# Patient Record
Sex: FEMALE | Race: WHITE
[De-identification: names, ages, dates, MRNs, and addresses within clinical notes are randomized per-mention and may not be internally consistent; named-entity substitution may affect disease eponyms.]

---

## 2018-05-18 ENCOUNTER — HOSPITAL ENCOUNTER (EMERGENCY)
Dept: HOSPITAL 17 - NEPC | Age: 43
Discharge: HOME | End: 2018-05-18
Payer: SELF-PAY

## 2018-05-18 VITALS — RESPIRATION RATE: 17 BRPM | OXYGEN SATURATION: 97 %

## 2018-05-18 VITALS — WEIGHT: 110.23 LBS | HEIGHT: 60 IN | BODY MASS INDEX: 21.64 KG/M2

## 2018-05-18 VITALS
DIASTOLIC BLOOD PRESSURE: 89 MMHG | TEMPERATURE: 97.5 F | HEART RATE: 75 BPM | SYSTOLIC BLOOD PRESSURE: 132 MMHG | RESPIRATION RATE: 20 BRPM | OXYGEN SATURATION: 97 %

## 2018-05-18 VITALS
RESPIRATION RATE: 15 BRPM | OXYGEN SATURATION: 100 % | DIASTOLIC BLOOD PRESSURE: 75 MMHG | SYSTOLIC BLOOD PRESSURE: 130 MMHG | HEART RATE: 85 BPM | TEMPERATURE: 98 F

## 2018-05-18 DIAGNOSIS — R06.02: ICD-10-CM

## 2018-05-18 DIAGNOSIS — R50.9: ICD-10-CM

## 2018-05-18 DIAGNOSIS — N64.4: Primary | ICD-10-CM

## 2018-05-18 LAB
BASOPHILS # BLD AUTO: 0.1 TH/MM3 (ref 0–0.2)
BASOPHILS NFR BLD: 0.6 % (ref 0–2)
BUN SERPL-MCNC: 14 MG/DL (ref 7–18)
CALCIUM SERPL-MCNC: 8.5 MG/DL (ref 8.5–10.1)
CHLORIDE SERPL-SCNC: 107 MEQ/L (ref 98–107)
CREAT SERPL-MCNC: 0.77 MG/DL (ref 0.5–1)
D-DIMER: (no result) MG/L FEU (ref 0–0.5)
EOSINOPHIL # BLD: 0.3 TH/MM3 (ref 0–0.4)
EOSINOPHIL NFR BLD: 2.9 % (ref 0–4)
ERYTHROCYTE [DISTWIDTH] IN BLOOD BY AUTOMATED COUNT: 13.7 % (ref 11.6–17.2)
GFR SERPLBLD BASED ON 1.73 SQ M-ARVRAT: 82 ML/MIN (ref 89–?)
GLUCOSE SERPL-MCNC: 100 MG/DL (ref 74–106)
HCO3 BLD-SCNC: 26.3 MEQ/L (ref 21–32)
HCT VFR BLD CALC: 35.5 % (ref 35–46)
HGB BLD-MCNC: 12.1 GM/DL (ref 11.6–15.3)
INR PPP: 1 RATIO
LYMPHOCYTES # BLD AUTO: 2.7 TH/MM3 (ref 1–4.8)
LYMPHOCYTES NFR BLD AUTO: 28.1 % (ref 9–44)
MAGNESIUM SERPL-MCNC: 2.4 MG/DL (ref 1.5–2.5)
MCH RBC QN AUTO: 28.8 PG (ref 27–34)
MCHC RBC AUTO-ENTMCNC: 34 % (ref 32–36)
MCV RBC AUTO: 84.9 FL (ref 80–100)
MONOCYTE #: 0.6 TH/MM3 (ref 0–0.9)
MONOCYTES NFR BLD: 6.2 % (ref 0–8)
NEUTROPHILS # BLD AUTO: 6.1 TH/MM3 (ref 1.8–7.7)
NEUTROPHILS NFR BLD AUTO: 62.2 % (ref 16–70)
PLATELET # BLD: 200 TH/MM3 (ref 150–450)
PMV BLD AUTO: 8.3 FL (ref 7–11)
PROTHROMBIN TIME: 10.3 SEC (ref 9.8–11.6)
RBC # BLD AUTO: 4.18 MIL/MM3 (ref 4–5.3)
SODIUM SERPL-SCNC: 142 MEQ/L (ref 136–145)
TROPONIN I SERPL-MCNC: (no result) NG/ML (ref 0.02–0.05)
WBC # BLD AUTO: 9.8 TH/MM3 (ref 4–11)

## 2018-05-18 PROCEDURE — 85379 FIBRIN DEGRADATION QUANT: CPT

## 2018-05-18 PROCEDURE — 83735 ASSAY OF MAGNESIUM: CPT

## 2018-05-18 PROCEDURE — 84484 ASSAY OF TROPONIN QUANT: CPT

## 2018-05-18 PROCEDURE — 71046 X-RAY EXAM CHEST 2 VIEWS: CPT

## 2018-05-18 PROCEDURE — 85730 THROMBOPLASTIN TIME PARTIAL: CPT

## 2018-05-18 PROCEDURE — 93005 ELECTROCARDIOGRAM TRACING: CPT

## 2018-05-18 PROCEDURE — 85025 COMPLETE CBC W/AUTO DIFF WBC: CPT

## 2018-05-18 PROCEDURE — 85610 PROTHROMBIN TIME: CPT

## 2018-05-18 PROCEDURE — 82550 ASSAY OF CK (CPK): CPT

## 2018-05-18 PROCEDURE — 80048 BASIC METABOLIC PNL TOTAL CA: CPT

## 2018-05-18 NOTE — RADRPT
EXAM DATE/TIME:  05/18/2018 17:18 

 

[facility] COMPARISON:     

No previous studies available for comparison.

 

                     

INDICATIONS :     

Chest pain and shortness of breath.

                     

 

MEDICAL HISTORY :            

Unobtainable.   

 

SURGICAL HISTORY :        

Unobtainable.

 

ENCOUNTER:     

Initial                                        

 

ACUITY:     

1 day      

 

PAIN SCORE:     

7/10

 

LOCATION:      

chest 

 

FINDINGS:     

PA and lateral views of the chest demonstrate the lungs to be symmetrically aerated without evidence 
of mass, infiltrate or effusion.  The cardiomediastinal contours are unremarkable.  Osseous structure
s are intact.

 

CONCLUSION:     Normal examination.  

 

 

 

 Bucky Kim MD on May 18, 2018 at 17:27           

Board Certified Radiologist.

 This report was verified electronically.

## 2018-05-18 NOTE — PD
HPI


Chief Complaint:  Chest Pain


Time Seen by Provider:  16:45


Travel History


International Travel<30 days:  No


Contact w/Intl Traveler<30days:  No


Traveled to known affect area:  No





History of Present Illness


HPI


pt is a pleasant 50 y.o female who presents to the ED with a cc of pain in the 

R breast. She states that for the past 3 months she has been having 

intermediate pain in R breast along with some yellow discharge from her R 

nipple. Pain has recently been constant and is 6/10. Worsens with movement of 

the chest and gets better when she lays on her L side. She also reports tactile 

fever and a weight loss of 10 pounds over the past year. Denies having any 

night sweat, or family hx of breast cancer





Modifying Factors: None


Associated Signs & Symptoms: 3 months of right-sided breast pain and right 

nipple discharge


Risk Factors: None





History


Past Medical History


Tetanus Vaccination:  Unknown


Influenza Vaccination:  No


Menopausal:  Yes





Past Surgical History


Surgical History:  No Previous Surgery





Social History


Alcohol Use:  No


Tobacco Use:  No





Allergies-Medications


(Allergen,Severity, Reaction):  


Coded Allergies:  


     No Known Allergies (Unverified , 5/18/18)


Reported Meds & Prescriptions





Reported Meds & Active Scripts


Active


No Active Prescriptions or Reported Medications    








Review of Systems


Except as stated in HPI:  all other systems reviewed are Neg





Physical Exam


Narrative


GENERAL: Well-developed middle-age  female patient currently and no 

acute distress but awake and oriented 3.


SKIN: Warm and dry.


HEAD: Atraumatic. Normocephalic. 


EYES: Pupils equal and round. No scleral icterus. No injection or drainage. 


ENT: No nasal bleeding or discharge.  Mucous membranes pink and moist.


NECK: Trachea midline. No JVD. 


CARDIOVASCULAR: Regular rate and rhythm.  


Breast: R breast tender to palpation. No masses, erythema, or retraction of 

nipple has been appreciated. There is no evidence of axillary or parasternal 

lymphadenopathy


RESPIRATORY: No accessory muscle use. Clear to auscultation. Breath sounds 

equal bilaterally. 


GASTROINTESTINAL: Abdomen soft, non-tender, nondistended. Hepatic and splenic 

margins not palpable. 


MUSCULOSKELETAL: Extremities without clubbing, cyanosis, or edema. No obvious 

deformities. 


NEUROLOGICAL: Awake and alert. No obvious cranial nerve deficits.  Motor 

grossly within normal limits. Five out of 5 muscle strength in the arms and 

legs.  Normal speech.


PSYCHIATRIC: Appropriate mood and affect; insight and judgment normal.





Data


Data


Last Documented VS





Vital Signs








  Date Time  Temp Pulse Resp B/P (MAP) Pulse Ox O2 Delivery O2 Flow Rate FiO2


 


5/18/18 17:20   17  97 Room Air  


 


5/18/18 15:39 97.5 75  132/89 (103)    








Orders





 Orders


Electrocardiogram (5/18/18 )


Electrocardiogram (5/18/18 17:02)


Basic Metabolic Panel (Bmp) (5/18/18 17:02)


Ckmb (Isoenzyme) Profile (5/18/18 17:02)


Complete Blood Count With Diff (5/18/18 17:02)


D-Dimer (5/18/18 17:02)


Magnesium (Mg) (5/18/18 17:02)


Prothrombin Time / Inr (Pt) (5/18/18 17:02)


Act Partial Throm Time (Ptt) (5/18/18 17:02)


Troponin I (5/18/18 17:02)


Ecg Monitoring (5/18/18 17:02)


Bilateral Bp Monitoring (5/18/18 17:02)


Iv Access Insert/Monitor (5/18/18 17:02)


Oximetry (5/18/18 17:02)


Oxygen Administration (5/18/18 17:02)


Sodium Chloride 0.9% Flush (Ns Flush) (5/18/18 17:15)


Chest, Pa & Lat (5/18/18 17:02)





Labs





Laboratory Tests








Test


  5/18/18


17:00


 


White Blood Count 9.8 TH/MM3 


 


Red Blood Count 4.18 MIL/MM3 


 


Hemoglobin 12.1 GM/DL 


 


Hematocrit 35.5 % 


 


Mean Corpuscular Volume 84.9 FL 


 


Mean Corpuscular Hemoglobin 28.8 PG 


 


Mean Corpuscular Hemoglobin


Concent 34.0 % 


 


 


Red Cell Distribution Width 13.7 % 


 


Platelet Count 200 TH/MM3 


 


Mean Platelet Volume 8.3 FL 


 


Neutrophils (%) (Auto) 62.2 % 


 


Lymphocytes (%) (Auto) 28.1 % 


 


Monocytes (%) (Auto) 6.2 % 


 


Eosinophils (%) (Auto) 2.9 % 


 


Basophils (%) (Auto) 0.6 % 


 


Neutrophils # (Auto) 6.1 TH/MM3 


 


Lymphocytes # (Auto) 2.7 TH/MM3 


 


Monocytes # (Auto) 0.6 TH/MM3 


 


Eosinophils # (Auto) 0.3 TH/MM3 


 


Basophils # (Auto) 0.1 TH/MM3 


 


CBC Comment DIFF FINAL 


 


Differential Comment  


 


Prothrombin Time 10.3 SEC 


 


Prothromb Time International


Ratio 1.0 RATIO 


 


 


Activated Partial


Thromboplast Time 24.5 SEC 


 


 


D-Dimer Quantitative (PE/DVT)


  LESS THAN 0.19


MG/L FEU


 


Blood Urea Nitrogen 14 MG/DL 


 


Creatinine 0.77 MG/DL 


 


Random Glucose 100 MG/DL 


 


Calcium Level 8.5 MG/DL 


 


Magnesium Level 2.4 MG/DL 


 


Sodium Level 142 MEQ/L 


 


Potassium Level 4.2 MEQ/L 


 


Chloride Level 107 MEQ/L 


 


Carbon Dioxide Level 26.3 MEQ/L 


 


Anion Gap 9 MEQ/L 


 


Estimat Glomerular Filtration


Rate 82 ML/MIN 


 


 


Total Creatine Kinase 62 U/L 


 


Troponin I


  LESS THAN 0.02


NG/ML











MDM


Medical Decision Making


Medical Screen Exam Complete:  Yes


Emergency Medical Condition:  Yes


Medical Record Reviewed:  Yes


Interpretation(s)


EKG shows NSR, no ST elevation or depression, and no arrhythmias.  No  

significant T-wave inversions.








Laboratory Tests








Test


  5/18/18


17:00


 


Estimat Glomerular Filtration


Rate 82 ML/MIN


(>89)


 


Troponin I


  LESS THAN 0.02


NG/ML








Last 24 hours Impressions








Chest X-Ray 5/18/18 1702 Signed





Impressions: 





 Service Date/Time:  Friday, May 18, 2018 17:18 - CONCLUSION: Normal 

examination. 





       Bucky Kim MD 








Differential Diagnosis


Breast abscess versus mastitis versus breast mass versus musculoskeletal versus 

atypical chest pain/ACS


Narrative Course


Symptoms appear to be mostly in the right breast in the lateral areas going up 

towards the axilla.  I do not see any signs of fluctuance, erythema, to 

indicate any infection.  It has been going on for several months now and at 

this point, I do not see an obvious mass.  However, patient should get this 

follow-up for further and will likely need mammogram for further evaluation.  

Her lab work was fairly unremarkable and cardiac enzymes, EKG, chest x-ray was 

unremarkable.  She did have some discomfort with deep breaths but d-dimer is 

negative.  At this point, my plan would be to release her with follow-up to get 

mammogram.  Return for any worsening symptoms as needed.  The plan has been 

discussed with her and she states understanding.





Diagnosis





 Primary Impression:  


 Breast pain, right


***Med/Other Pt SpecificInfo:  Prescription(s) given


Scripts


Ibuprofen (Ibuprofen) 600 Mg Tab


600 MG PO Q6H Y for Pain/Inflammation, #20 TAB 0 Refills


   Prov: Kaylah Ivy MD         5/18/18


Disposition:  01 DISCHARGE HOME


Condition:  Stable











Kaylah Ivy MD May 18, 2018 16:55

## 2018-05-19 NOTE — EKG
Date Performed: 05/18/2018       Time Performed: 15:56:19

 

PTAGE:      42 years

 

EKG:      Sinus rhythm 

 

 NORMAL ECG

 

PREVIOUS TRACING       : 05/18/2018 15.55 Since the previous tracing, no significant change noted

 

DOCTOR:   Hay Sanchez  Interpretating Date/Time  05/19/2018 13:52:02